# Patient Record
Sex: FEMALE | URBAN - METROPOLITAN AREA
[De-identification: names, ages, dates, MRNs, and addresses within clinical notes are randomized per-mention and may not be internally consistent; named-entity substitution may affect disease eponyms.]

---

## 2022-01-26 ENCOUNTER — IMPORTED ENCOUNTER (OUTPATIENT)
Dept: URBAN - METROPOLITAN AREA CLINIC 31 | Facility: CLINIC | Age: 67
End: 2022-01-26

## 2022-01-26 PROBLEM — H04.211: Noted: 2022-01-26

## 2022-01-26 PROBLEM — H40.012: Noted: 2022-01-26

## 2022-01-26 PROBLEM — H26.493: Noted: 2022-01-26

## 2022-01-26 PROBLEM — Z96.1: Noted: 2022-01-26

## 2022-01-26 PROCEDURE — 92015 DETERMINE REFRACTIVE STATE: CPT

## 2022-01-26 PROCEDURE — 99204 OFFICE O/P NEW MOD 45 MIN: CPT

## 2022-01-26 PROCEDURE — 92133 CPTRZD OPH DX IMG PST SGM ON: CPT

## 2022-01-26 NOTE — PATIENT DISCUSSION
1.  PCO  OU (Posterior Capsule Opacification)   PCO is visually significant and impairment of vision does not meet the patient’s functional needs or interferes with activities of daily living. Risks benefits and alternatives to the Nd:YAG Laser reviewed including elevated IOP immediately postop and retinal tear/detachment. Patient to notify their ophthalmologist promptly if they have a significant change in symptoms such as flashes of light (photopsia) an increase in floaters loss of visual field or decrease in visual acuity after the procedure. Patient will be scheduled in [place] [id number] for Nd:YAG Laser. OD/OS No po needed. F/U 1 yr CE. 2. Pseudophakia OU - IOLs stable. Monitor for changes in vision. 3.  Epiphora OD - Has had irrigation w/ Dr Monique Soares previously and has obstruction but does not want to proceed w/ Consult w/ Dr Ame Novoa  as recommended at this time. Patient wants to observe. 4. Glaucoma suspect OS -C/D asymmetry  Nerve OCT Today. Return for an appointment in 1 year for comprehensive exam. with Dr. Margaret Oneill

## 2022-04-02 ASSESSMENT — VISUAL ACUITY
OD_CC: 20/40
OS_GLARE: 20/60MED
OS_SC: 20/20-2
OD_SC: 20/25-1
OD_GLARE: 20/60MED
OS_CC: 20/40

## 2022-04-02 ASSESSMENT — TONOMETRY
OD_IOP_MMHG: 16
OS_IOP_MMHG: 16